# Patient Record
Sex: FEMALE | Race: WHITE | NOT HISPANIC OR LATINO | URBAN - METROPOLITAN AREA
[De-identification: names, ages, dates, MRNs, and addresses within clinical notes are randomized per-mention and may not be internally consistent; named-entity substitution may affect disease eponyms.]

---

## 2019-02-13 ENCOUNTER — TELEPHONE (OUTPATIENT)
Dept: OTOLARYNGOLOGY | Facility: CLINIC | Age: 59
End: 2019-02-13

## 2023-07-31 ENCOUNTER — OFFICE VISIT (OUTPATIENT)
Dept: WOUND CARE | Facility: CLINIC | Age: 63
End: 2023-07-31
Payer: COMMERCIAL

## 2023-07-31 VITALS
WEIGHT: 185 LBS | BODY MASS INDEX: 27.4 KG/M2 | HEART RATE: 64 BPM | SYSTOLIC BLOOD PRESSURE: 135 MMHG | HEIGHT: 69 IN | RESPIRATION RATE: 18 BRPM | TEMPERATURE: 97 F | DIASTOLIC BLOOD PRESSURE: 70 MMHG

## 2023-07-31 DIAGNOSIS — T23.231A PARTIAL THICKNESS BURN OF MULTIPLE FINGERS OF RIGHT HAND EXCLUDING THUMB, INITIAL ENCOUNTER: Primary | ICD-10-CM

## 2023-07-31 PROBLEM — Z98.890 HX OF MITRAL VALVE REPAIR: Status: ACTIVE | Noted: 2018-03-28

## 2023-07-31 PROCEDURE — 99214 OFFICE O/P EST MOD 30 MIN: CPT | Performed by: ORTHOPAEDIC SURGERY

## 2023-07-31 PROCEDURE — 99203 OFFICE O/P NEW LOW 30 MIN: CPT | Performed by: ORTHOPAEDIC SURGERY

## 2023-07-31 PROCEDURE — G0463 HOSPITAL OUTPT CLINIC VISIT: HCPCS | Performed by: ORTHOPAEDIC SURGERY

## 2023-07-31 RX ORDER — IBUPROFEN 800 MG/1
800 TABLET ORAL DAILY
COMMUNITY
Start: 2023-02-14 | End: 2024-02-14

## 2023-07-31 RX ORDER — HYDROCHLOROTHIAZIDE 25 MG/1
25 TABLET ORAL DAILY
COMMUNITY

## 2023-07-31 RX ORDER — TRIAMCINOLONE ACETONIDE 1 MG/G
1 CREAM TOPICAL DAILY
COMMUNITY
Start: 2023-04-29

## 2023-07-31 RX ORDER — TITANIUM DIOXIDE, OCTINOXATE, ZINC OXIDE 4.61; 1.6; .78 G/40ML; G/40ML; G/40ML
400 CREAM TOPICAL DAILY
COMMUNITY

## 2023-07-31 RX ORDER — ASPIRIN 325 MG
325 TABLET, DELAYED RELEASE (ENTERIC COATED) ORAL DAILY
COMMUNITY

## 2023-07-31 NOTE — PATIENT INSTRUCTIONS
Orders Placed This Encounter   Procedures    Wound cleansing and dressings     Wound location Right Hand Wounds  Change dressing daily  You may remove the dressing and shower. Do not leave wound open to air, apply new dressing immediately. Cleanse the wound with normal saline solution, pat dry. Apply silvadene ointment to the wound bed. Cover with gauze  Secure with Netting    This was applied today at the [facility].      Standing Status:   Future     Standing Expiration Date:   7/31/2024

## 2023-07-31 NOTE — PROGRESS NOTES
Patient ID: William Nogueira is a 57 y.o. female Date of Birth 1960       Chief Complaint   Patient presents with   • New Patient Visit     Right hand wounds       Allergies:  Valacyclovir, Nickel, and Penicillins    Diagnosis:      Diagnosis ICD-10-CM Associated Orders   1. Partial thickness burn of multiple fingers of right hand excluding thumb, initial encounter  T23.231A Wound cleansing and dressings              Assessment and Plan :  • Initial evaluation of 2nd degree burns to four digits of Right hand, not including thumb. • Wound management with Prescribed Silvadene, see wound orders below. • Maintain wound covered with gauze and secure with burn netting. Followup in 2 week(s) or call sooner with questions or concerns or any signs of infection such as redness, swelling, increased/purulent drainage, fever, chills, increased severe pain. Subjective:   7/31: Pt is a 57 y.o. female with pmhx Meniere's disease, eczema and h/o Mitral Valve Repair (2016; on ASA 325mg) who presents for initial eval of a second degree burn to to four digits on her right hand, not including her thumb. Pt states she burned her hand 3 days ago (7/18/23) while cooking parks and the oil splashed onto her hand. Pt states her fingers filled up with fluid and she has been applying aloe on the wound beds and covering with gauze. No drainage. No diabetes.  No smoking or drug use. Pt states she drinks on occasion. Pt denies any sob, fatigue, N/V, CP, fever or chills.         The following portions of the patient's history were reviewed and updated as appropriate:   Patient Active Problem List   Diagnosis   • Hx of mitral valve repair     Past Medical History:   Diagnosis Date   • Eczema    • Meniere's disease      Past Surgical History:   Procedure Laterality Date   • MITRAL VALVE REPAIR       Family History   Problem Relation Age of Onset   • Heart disease Mother    • Heart disease Father       Social History     Socioeconomic History   • Marital status: /Civil Union     Spouse name: None   • Number of children: None   • Years of education: None   • Highest education level: None   Occupational History   • None   Tobacco Use   • Smoking status: Never   • Smokeless tobacco: None   Substance and Sexual Activity   • Alcohol use: Never   • Drug use: Never   • Sexual activity: None   Other Topics Concern   • None   Social History Narrative   • None     Social Determinants of Health     Financial Resource Strain: Not on file   Food Insecurity: Not on file   Transportation Needs: Not on file   Physical Activity: Not on file   Stress: Not on file   Social Connections: Not on file   Intimate Partner Violence: Not on file   Housing Stability: Not on file        Current Outpatient Medications:   •  ibuprofen (MOTRIN) 800 mg tablet, Take 800 mg by mouth in the morning, Disp: , Rfl:   •  triamcinolone (KENALOG) 0.1 % cream, Apply 1 Application topically in the morning, Disp: , Rfl:   •  aspirin (ECOTRIN) 325 mg EC tablet, Take 325 mg by mouth daily, Disp: , Rfl:   •  Cranberry 400 MG CAPS, Take 400 mg by mouth in the morning, Disp: , Rfl:   •  FIBER COMPLETE PO, Take 1 capsule by mouth in the morning, Disp: , Rfl:   •  hydrochlorothiazide (HYDRODIURIL) 25 mg tablet, Take 25 mg by mouth daily, Disp: , Rfl:   •  Multiple Vitamins-Minerals (VITAMIN D3 COMPLETE PO), Take 1 tablet by mouth every morning, Disp: , Rfl:     Review of Systems   Constitutional: Negative for appetite change, chills, fatigue, fever and unexpected weight change. HENT: Negative for congestion, hearing loss and postnasal drip. Respiratory: Negative for cough and shortness of breath. Cardiovascular: Negative for leg swelling. Musculoskeletal: Positive for gait problem. Skin: Positive for wound (4 digits on R hand, not including thumb). Negative for rash. Neurological: Negative for numbness. Hematological: Does not bruise/bleed easily.    Psychiatric/Behavioral: Negative. Objective:  /70   Pulse 64   Temp (!) 97 °F (36.1 °C)   Resp 18   Ht 5' 9" (1.753 m)   Wt 83.9 kg (185 lb)   BMI 27.32 kg/m²   Pain Score: 0-No pain     Physical Exam  Vitals reviewed. Constitutional:       General: She is not in acute distress. Appearance: Normal appearance. She is well-developed and normal weight. HENT:      Head: Normocephalic and atraumatic. Cardiovascular:      Rate and Rhythm: Normal rate. Pulmonary:      Effort: Pulmonary effort is normal.   Musculoskeletal:         General: No deformity. Right hand: Swelling present. No tenderness or bony tenderness. Normal range of motion. Normal strength. Normal sensation. There is no disruption of two-point discrimination. Normal capillary refill. Arms:       Right lower leg: No edema. Left lower leg: No edema. Comments: Fluid filled roofed blisters on all four digits with purplish periwound. See wound assessment. Skin:     General: Skin is warm and dry. Findings: Wound (4 digits on R hand, not including thumb) present. Neurological:      General: No focal deficit present. Mental Status: She is alert and oriented to person, place, and time. Gait: Gait normal.   Psychiatric:         Mood and Affect: Mood and affect normal.         Behavior: Behavior normal. Behavior is cooperative.                        Wound 07/31/23 Burn Finger (Comment which one) Posterior;Right (Active)   Wound Image Images linked 07/31/23 1322   Wound Description Epithelialization (fluid filled blister) 07/31/23 1322   Funmilayo-wound Assessment Purple;[place] 07/31/23 1322   Wound Length (cm) 5 cm 07/31/23 1322   Wound Width (cm) 1 cm 07/31/23 1322   Wound Depth (cm) 0 cm 07/31/23 1322   Wound Surface Area (cm^2) 5 cm^2 07/31/23 1322   Wound Volume (cm^3) 0 cm^3 07/31/23 1322   Calculated Wound Volume (cm^3) 0 cm^3 07/31/23 1322   Drainage Amount None 07/31/23 1322   Non-staged Wound Description Partial thickness 07/31/23 1322   Dressing Status Intact 07/31/23 1322       Wound 07/31/23 Burn Finger (Comment which one) Posterior;Right (Active)   Wound Image Images linked 07/31/23 1323   Wound Description Epithelialization 07/31/23 1323   Funmilayo-wound Assessment Purple;[place] 07/31/23 1323   Wound Length (cm) 1.2 cm 07/31/23 1323   Wound Width (cm) 1.8 cm 07/31/23 1323   Wound Depth (cm) 0 cm 07/31/23 1323   Wound Surface Area (cm^2) 2.16 cm^2 07/31/23 1323   Wound Volume (cm^3) 0 cm^3 07/31/23 1323   Calculated Wound Volume (cm^3) 0 cm^3 07/31/23 1323   Drainage Amount None 07/31/23 1323   Non-staged Wound Description Partial thickness 07/31/23 1323   Dressing Status Intact 07/31/23 1323       Wound 07/31/23 Burn Finger (Comment which one) Posterior;Right (Active)   Wound Image Images linked 07/31/23 1319   Wound Description Epithelialization 07/31/23 1319   Funmilayo-wound Assessment Purple;[place] 07/31/23 1319   Wound Length (cm) 2.5 cm 07/31/23 1319   Wound Width (cm) 0.5 cm 07/31/23 1319   Wound Depth (cm) 0 cm 07/31/23 1319   Wound Surface Area (cm^2) 1.25 cm^2 07/31/23 1319   Wound Volume (cm^3) 0 cm^3 07/31/23 1319   Calculated Wound Volume (cm^3) 0 cm^3 07/31/23 1319   Drainage Amount None 07/31/23 1319   Non-staged Wound Description Partial thickness 07/31/23 1319   Dressing Status Intact 07/31/23 1319                  Wound Instructions:  Orders Placed This Encounter   Procedures   • Wound cleansing and dressings     Wound location Right Hand Wounds  Change dressing daily  You may remove the dressing and shower. Do not leave wound open to air, apply new dressing immediately. Cleanse the wound with normal saline solution, pat dry. Apply silvadene ointment to the wound bed. Cover with gauze  Secure with Netting    This was applied today at the [facility]. Standing Status:   Future     Standing Expiration Date:   7/31/2024       Total time spent today:  30 minutes.   This includes reviewing the patient's chart, pertinent physician records from Dr. Nickie Ruelas, Cardiology 11/17/22. Jannette Urbano PA-C, [facility]    Portions of the record may have been created with voice recognition software. Occasional wrong word or "sound alike" substitutions may have occurred due to the inherent limitations of voice recognition software. Read the chart carefully and recognize, using context, where substitutions have occurred.

## 2023-08-16 ENCOUNTER — TELEPHONE (OUTPATIENT)
Dept: WOUND CARE | Facility: CLINIC | Age: 63
End: 2023-08-16

## 2024-01-30 ENCOUNTER — OFFICE VISIT (OUTPATIENT)
Age: 64
End: 2024-01-30
Payer: MEDICARE

## 2024-01-30 VITALS
RESPIRATION RATE: 18 BRPM | OXYGEN SATURATION: 100 % | DIASTOLIC BLOOD PRESSURE: 69 MMHG | HEART RATE: 70 BPM | SYSTOLIC BLOOD PRESSURE: 146 MMHG | TEMPERATURE: 97.4 F

## 2024-01-30 DIAGNOSIS — R10.9 ABDOMINAL PAIN, UNSPECIFIED ABDOMINAL LOCATION: Primary | ICD-10-CM

## 2024-01-30 LAB
SL AMB  POCT GLUCOSE, UA: ABNORMAL
SL AMB LEUKOCYTE ESTERASE,UA: ABNORMAL
SL AMB POCT BILIRUBIN,UA: ABNORMAL
SL AMB POCT BLOOD,UA: ABNORMAL
SL AMB POCT CLARITY,UA: CLEAR
SL AMB POCT COLOR,UA: YELLOW
SL AMB POCT KETONES,UA: ABNORMAL
SL AMB POCT NITRITE,UA: ABNORMAL
SL AMB POCT PH,UA: 7
SL AMB POCT SPECIFIC GRAVITY,UA: 1.01
SL AMB POCT URINE PROTEIN: ABNORMAL
SL AMB POCT UROBILINOGEN: 0.2

## 2024-01-30 PROCEDURE — 99213 OFFICE O/P EST LOW 20 MIN: CPT

## 2024-01-30 PROCEDURE — 81002 URINALYSIS NONAUTO W/O SCOPE: CPT

## 2024-01-30 PROCEDURE — 87086 URINE CULTURE/COLONY COUNT: CPT

## 2024-01-30 PROCEDURE — G0463 HOSPITAL OUTPT CLINIC VISIT: HCPCS

## 2024-01-30 RX ORDER — SULFAMETHOXAZOLE AND TRIMETHOPRIM 800; 160 MG/1; MG/1
1 TABLET ORAL EVERY 12 HOURS SCHEDULED
Qty: 6 TABLET | Refills: 0 | Status: SHIPPED | OUTPATIENT
Start: 2024-01-30 | End: 2024-02-02

## 2024-01-30 NOTE — PROGRESS NOTES
[facility]        NAME: Sonam Rodrigues is a 63 y.o. female  : 1960    MRN: [id number]  DATE: 2024  TIME: 4:02 PM    Assessment and Plan   Abdominal pain, unspecified abdominal location [R10.9]  1. Abdominal pain, unspecified abdominal location  POCT urine dip    Urine culture    sulfamethoxazole-trimethoprim (BACTRIM DS) 800-160 mg per tablet    Ambulatory Referral to Obstetrics / Gynecology      In office urine dip shows large blood, urine culture results pending [facility]   Begin antibiotics as empirical treatment for UTI, ensure good hydration.   Continue to monitor symptoms and present to emergency department if abdominal pain is worsening.   Follow up with Ob/Gyn as directed.       Patient Instructions     Pelvic Pain   WHAT YOU NEED TO KNOW:   Pelvic pain may be caused by a number of conditions, such as irritable bowel syndrome, appendicitis, or constipation. A urinary tract infection, prostate inflammation, menstrual cramps, or kidney stones can also cause pelvic pain. You may have pain on one or both sides of your pelvis. Pelvic pain can develop if you have trauma to your pelvis or if you sit or stand for a long time.   DISCHARGE INSTRUCTIONS:   Medicines:  You may need any of the following:  NSAIDs , such as ibuprofen, help decrease swelling, pain, and fever. This medicine is available with or without a doctor's order. NSAIDs can cause stomach bleeding or kidney problems in certain people. If you take blood thinner medicine, always ask your healthcare provider if NSAIDs are safe for you. Always read the medicine label and follow directions.     Prescription pain medicine  may be given. Ask how to take this medicine safely.     Birth control medicines  may help decrease pain in women.     Take your medicine as directed.  Contact your healthcare provider if you think your medicine is not helping or if you have side effects. Tell your  provider if you are allergic to any medicine. Keep a list of the medicines, vitamins, and herbs you take. Include the amounts, and when and why you take them. Bring the list or the pill bottles to follow-up visits. Carry your medicine list with you in case of an emergency.     Call 911 for any of the following:   You have severe chest pain and sudden trouble breathing.        Contact your healthcare provider if:   You have a fever.     You have nausea, vomiting, or diarrhea.     Your pain does not go away, or it gets worse, even after treatment.     You have numbness in your legs or toes.     You have heavy or unusual vaginal bleeding.     You have questions or concerns about your condition or care.     Manage your pelvic pain:   Keep a pain record.  Write down when your pain happens and how severe it is. Include any other symptoms you have with your pain. A record will help you keep track of pain cycles. Bring the record with you to your follow-up visits. It may also help your healthcare provider find out what is causing your pain.     Learn ways to relax.  Deep breathing, meditation, and relaxation techniques can help decrease your pain. When you are tense, your pain may increase.     Treat or prevent constipation.  Drink liquids as directed. You may need to drink more liquid than usual. Ask your healthcare provider how much liquid to drink each day. Eat high-fiber foods. High-fiber foods include fruit, vegetables, whole-grain breads and cereals, and beans. You may need to change the foods you eat if you have irritable bowel syndrome.     Follow up with your healthcare provider as directed:  You may need physical therapy. You may need to see an orthopedic specialist. Write down your questions so you remember to ask them during your visits.  © Copyright [employer] 2023 Information is for End User's use only and may not be sold, redistributed or otherwise used for commercial purposes.  The above information is an   only. It is not intended as medical advice for individual conditions or treatments. Talk to your doctor, nurse or pharmacist before following any medical regimen to see if it is safe and effective for you.          Follow up with PCP in 3-5 days.  Proceed to  ER if symptoms worsen.    Chief Complaint     Chief Complaint   Patient presents with    Abdominal Pain     Patient been having lower abdominal pain for about 10 days, Stated she noticed the pain after her obgyn exam on the 19th.          History of Present Illness       63 year old female with PMH significant for MVR, chronic UTIs, presents for evaluation of pelvic pressure and slight increase in urinary frequency/urgency over the last several days. She reports that 10 days ago she had a Pap smear at her Ob/Gyn office and noticed the symptoms shortly after. She presented to the urgent care clinic this past Saturday where a urine dip and culture were performed and both were reportedly negative-results unavailable here. She does take cranberry juice pills chronically. She states that her Pap smear results were abnormal and a colposcopy was recommended, but [facility]. She denies fever, vaginal discharge or spotting, dyspareunia, concern for STI.     Abdominal Pain  Associated symptoms include frequency. Pertinent negatives include no arthralgias, diarrhea, dysuria, fever, headaches, hematuria, myalgias, nausea or vomiting.       Review of Systems   Review of Systems   Constitutional:  Negative for fatigue and fever.   HENT:  Negative for congestion, ear discharge, ear pain, postnasal drip, rhinorrhea, sinus pressure, sinus pain, sneezing and sore throat.    Eyes: Negative.  Negative for pain, discharge, redness and itching.   Respiratory: Negative.  Negative for apnea, cough, choking, chest tightness, shortness of breath, wheezing and stridor.    Cardiovascular: Negative.  Negative for chest pain and palpitations.    Gastrointestinal:  Negative for abdominal distention, abdominal pain, diarrhea, nausea and vomiting.   Endocrine: Negative.  Negative for polydipsia, polyphagia and polyuria.   Genitourinary:  Positive for frequency, pelvic pain and urgency. Negative for decreased urine volume, difficulty urinating, dyspareunia, dysuria, enuresis, flank pain, genital sores, hematuria, menstrual problem, vaginal bleeding, vaginal discharge and vaginal pain.   Musculoskeletal: Negative.  Negative for arthralgias, back pain, myalgias, neck pain and neck stiffness.   Skin: Negative.  Negative for color change and rash.   Allergic/Immunologic: Negative.  Negative for environmental allergies.   Neurological: Negative.  Negative for dizziness, facial asymmetry, light-headedness, numbness and headaches.   Hematological: Negative.  Negative for adenopathy.   Psychiatric/Behavioral: Negative.           Current Medications       Current Outpatient Medications:     sulfamethoxazole-trimethoprim (BACTRIM DS) 800-160 mg per tablet, Take 1 tablet by mouth every 12 (twelve) hours for 3 days, Disp: 6 tablet, Rfl: 0    aspirin (ECOTRIN) 325 mg EC tablet, Take 325 mg by mouth daily, Disp: , Rfl:     Cranberry 400 MG CAPS, Take 400 mg by mouth in the morning, Disp: , Rfl:     FIBER COMPLETE PO, Take 1 capsule by mouth in the morning, Disp: , Rfl:     hydrochlorothiazide (HYDRODIURIL) 25 mg tablet, Take 25 mg by mouth daily, Disp: , Rfl:     ibuprofen (MOTRIN) 800 mg tablet, Take 800 mg by mouth in the morning, Disp: , Rfl:     Multiple Vitamins-Minerals (VITAMIN D3 COMPLETE PO), Take 1 tablet by mouth every morning, Disp: , Rfl:     silver sulfadiazine (Silvadene) 1 % cream, Apply topically daily, Disp: 50 g, Rfl: 0    triamcinolone (KENALOG) 0.1 % cream, Apply 1 Application topically in the morning, Disp: , Rfl:     Current Allergies     Allergies as of 01/30/2024 - Reviewed 01/30/2024   Allergen Reaction Noted    Valacyclovir Anaphylaxis 11/17/2017     Nickel Rash 09/28/2017    Penicillins Rash 09/28/2017            The following portions of the patient's history were reviewed and updated as appropriate: allergies, current medications, past family history, past medical history, past social history, past surgical history and problem list.     Past Medical History:   Diagnosis Date    Eczema     Meniere's disease        Past Surgical History:   Procedure Laterality Date    MITRAL VALVE REPAIR         Family History   Problem Relation Age of Onset    Heart disease Mother     Heart disease Father          Medications have been verified.        Objective   /69   Pulse 70   Temp (!) 97.4 °F (36.3 °C)   Resp 18   SpO2 100%        Physical Exam     Physical Exam  Vitals and nursing note reviewed.   Constitutional:       General: She is not in acute distress.     Appearance: Normal appearance. She is not ill-appearing, toxic-appearing or diaphoretic.      Interventions: She is not intubated.  HENT:      Head: Normocephalic and atraumatic.      Right Ear: Tympanic membrane normal.      Left Ear: Tympanic membrane normal.      Nose: Nose normal. No congestion or rhinorrhea.      Mouth/Throat:      Mouth: Mucous membranes are moist.   Eyes:      Extraocular Movements: Extraocular movements intact.      Conjunctiva/sclera: Conjunctivae normal.      Pupils: Pupils are equal, round, and reactive to light.   Cardiovascular:      Rate and Rhythm: Normal rate and regular rhythm.      Pulses: Normal pulses.      Heart sounds: Normal heart sounds, S1 normal and S2 normal. Heart sounds not distant. No murmur heard.     No friction rub. No gallop.   Pulmonary:      Effort: Pulmonary effort is normal. No tachypnea, bradypnea, accessory muscle usage, prolonged expiration, respiratory distress or retractions. She is not intubated.      Breath sounds: Normal breath sounds. No stridor, decreased air movement or transmitted upper airway sounds. No decreased breath sounds,  wheezing, rhonchi or rales.   Abdominal:      General: Abdomen is flat. Bowel sounds are normal.      Palpations: Abdomen is soft.      Tenderness: There is no abdominal tenderness. There is no right CVA tenderness, left CVA tenderness, guarding or rebound.   Musculoskeletal:         General: Normal range of motion.      Cervical back: Normal range of motion and neck supple. No tenderness.   Skin:     General: Skin is warm and dry.      Capillary Refill: Capillary refill takes less than 2 seconds.   Neurological:      General: No focal deficit present.      Mental Status: She is alert and oriented to person, place, and time.      Cranial Nerves: No cranial nerve deficit.   Psychiatric:         Mood and Affect: Mood normal.         Behavior: Behavior normal.

## 2024-01-30 NOTE — PATIENT INSTRUCTIONS
In office urine dip shows large blood, urine culture results pending and [facility].   Begin antibiotics as empirical treatment for UTI, ensure good hydration.   Continue to monitor symptoms and present to emergency department if abdominal pain is worsening.   Follow up with Ob/Gyn as directed.

## 2024-01-31 LAB — BACTERIA UR CULT: NORMAL
